# Patient Record
Sex: FEMALE | Race: WHITE | NOT HISPANIC OR LATINO
[De-identification: names, ages, dates, MRNs, and addresses within clinical notes are randomized per-mention and may not be internally consistent; named-entity substitution may affect disease eponyms.]

---

## 2022-05-10 PROBLEM — Z00.00 ENCOUNTER FOR PREVENTIVE HEALTH EXAMINATION: Status: ACTIVE | Noted: 2022-05-10

## 2022-05-12 ENCOUNTER — APPOINTMENT (OUTPATIENT)
Dept: ORTHOPEDIC SURGERY | Facility: CLINIC | Age: 52
End: 2022-05-12
Payer: COMMERCIAL

## 2022-05-12 DIAGNOSIS — S46.011A STRAIN OF MUSCLE(S) AND TENDON(S) OF THE ROTATOR CUFF OF RIGHT SHOULDER, INITIAL ENCOUNTER: ICD-10-CM

## 2022-05-12 PROCEDURE — 99213 OFFICE O/P EST LOW 20 MIN: CPT

## 2022-05-13 PROBLEM — S46.011A TRAUMATIC COMPLETE TEAR OF RIGHT ROTATOR CUFF, INITIAL ENCOUNTER: Status: ACTIVE | Noted: 2022-05-12

## 2022-05-13 NOTE — IMAGING
[de-identified] : The patient is a well appearing 51 year old female of their stated age.\par Neck is supple & nontender to palpation. Negative Spurling's test.\par \par \par Surgical site: right shoulder \par \par Incision sites: Well healed\par \par Range of motion: 170/170/80/30/30\par \par Motor Testin/5\par \par Stability Testing: Stable ligamentous exam\par \par Swelling/Effusion: None\par \par Tenderness to palpation: None\par \par Provocative testing: Negative\par \par Right Calf: soft and nontender\par Left Calf: soft and nontender\par \par Neurovascular Examination: Grossly intact, 2+ distal pulses \par \par \par Assessment & Plan: The patient is approximately 5.5 months s/p Right shoulder arthroscopic rotator cuff repair, biceps tenodesis in loop and tack fashion, arthroscopic capsular release, subacromial decompression, acromioplasty, coracoacromial ligament release, extensive bursectomy, rotator interval debridement, anterior, posterior, superior and inferior debridement with interval improvement (DOS 21). The patient's post-op plan, protocol and activity modifications have been thoroughly discussed and the patient expressed understanding. Patient will finish out physical therapy c/t to focus on ROM. Discussed home stretching protocol and working on stabilizing their scapula while working on ROM. Patient will follow up prn\par \par The patient's current medication management of their orthopedic diagnosis was reviewed today:\par (1) We discussed a comprehensive treatment plan that included possible pharmaceutical management involving the use of prescription strength medications including but not limited to options such as oral Naprosyn 500mg BID, once daily Meloxicam 15 mg, or 500-650 mg Tylenol versus over the counter oral medications and topical prescription NSAID Pennsaid vs over the counter Voltaren gel.\par (2) There is a moderate risk of morbidity with further treatment, especially from use of prescription strength medications and possible side effects of these medications which include upset stomach with oral medications, skin reactions to topical medications and cardiac/renal issues with long term use.\par (3) I recommended that the patient follow-up with their medical physician to discuss any significant specific potential issues with long term medication use such as interactions with current medications or with exacerbation of underlying medical comorbidities.\par (4) The benefits and risks associated with use of injectable, oral or topical, prescription and over the counter anti-inflammatory medications were discussed with the patient. The patient voiced understanding of the risks including but not limited to bleeding, stroke, kidney dysfunction, heart disease, and were referred to the black box warning label for further information.\par \par I, Bairon Dotson, attest that this documentation has been prepared under the direction and in the presence of Provider Dr. Carroll\par \par The documentation recorded by the scribe accurately reflects the service I personally performed and the decisions made by me.\par

## 2022-05-13 NOTE — HISTORY OF PRESENT ILLNESS
[de-identified] : The patient is a 51 year old right hand dominant female who presents today for right shoulder follow up\par Date of Injury/Onset 3/2021 DOS: 11/23/2021\par Pain: At Rest: 0/10\par With Activity: 2/10\par Mechanism of injury: chronic injury with no specific TACO.\par This is NOT a Work Related Injury being treated under Worker's Compensation.\par This is NOT an athletic injury occurring associated with an interscholastic or organized sports team.\par Quality of symptoms: aching pain and soreness after PT, limited ROM\par Improves with: PT, rest, ice\par Worse with: reaching overhead\par Treatment/Imaging/Studies Since Last Visit: PT\par Reports Available For Review Today: none\par Out of work/sport: not working\par 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 Pain only occurs w/ specific movements. ROM has improved. \par Additional Information: s/p Right shoulder examination under anesthesia, manipulation under anesthesia, arthroscopic rotator cuff repair, biceps tenodesis in loop and tack fashion,\par arthroscopic capsular release, subacromial decompression, acromioplasty, coracoacromial ligament release, extensive bursectomy, rotator interval debridement, anterior, posterior,\par superior and inferior debridement.

## 2023-12-22 ENCOUNTER — APPOINTMENT (OUTPATIENT)
Dept: OPHTHALMOLOGY | Facility: CLINIC | Age: 53
End: 2023-12-22
Payer: COMMERCIAL

## 2023-12-22 ENCOUNTER — NON-APPOINTMENT (OUTPATIENT)
Age: 53
End: 2023-12-22

## 2023-12-22 PROCEDURE — 92015 DETERMINE REFRACTIVE STATE: CPT

## 2023-12-22 PROCEDURE — 92004 COMPRE OPH EXAM NEW PT 1/>: CPT

## 2023-12-22 PROCEDURE — 92310 CONTACT LENS FITTING OU: CPT

## 2024-12-11 ENCOUNTER — APPOINTMENT (OUTPATIENT)
Dept: ORTHOPEDIC SURGERY | Facility: CLINIC | Age: 54
End: 2024-12-11
Payer: COMMERCIAL

## 2024-12-11 VITALS — BODY MASS INDEX: 28.63 KG/M2 | WEIGHT: 200 LBS | HEIGHT: 70 IN

## 2024-12-11 DIAGNOSIS — M79.672 PAIN IN LEFT FOOT: ICD-10-CM

## 2024-12-11 DIAGNOSIS — Z78.9 OTHER SPECIFIED HEALTH STATUS: ICD-10-CM

## 2024-12-11 DIAGNOSIS — M72.2 PLANTAR FASCIAL FIBROMATOSIS: ICD-10-CM

## 2024-12-11 PROCEDURE — 99203 OFFICE O/P NEW LOW 30 MIN: CPT

## 2024-12-11 PROCEDURE — 73630 X-RAY EXAM OF FOOT: CPT | Mod: LT

## 2025-03-25 ENCOUNTER — RESULT REVIEW (OUTPATIENT)
Age: 55
End: 2025-03-25

## 2025-04-18 ENCOUNTER — APPOINTMENT (OUTPATIENT)
Dept: ORTHOPEDIC SURGERY | Facility: CLINIC | Age: 55
End: 2025-04-18
Payer: COMMERCIAL

## 2025-04-18 VITALS — BODY MASS INDEX: 28.63 KG/M2 | HEIGHT: 70 IN | WEIGHT: 200 LBS

## 2025-04-18 DIAGNOSIS — G89.29 PAIN IN LEFT SHOULDER: ICD-10-CM

## 2025-04-18 DIAGNOSIS — M25.512 PAIN IN LEFT SHOULDER: ICD-10-CM

## 2025-04-18 PROCEDURE — 73010 X-RAY EXAM OF SHOULDER BLADE: CPT | Mod: LT

## 2025-04-18 PROCEDURE — 73030 X-RAY EXAM OF SHOULDER: CPT | Mod: LT

## 2025-04-18 PROCEDURE — 99204 OFFICE O/P NEW MOD 45 MIN: CPT

## 2025-05-08 ENCOUNTER — APPOINTMENT (OUTPATIENT)
Dept: ORTHOPEDIC SURGERY | Facility: CLINIC | Age: 55
End: 2025-05-08
Payer: COMMERCIAL

## 2025-05-08 VITALS — HEIGHT: 70 IN | BODY MASS INDEX: 28.63 KG/M2 | WEIGHT: 200 LBS

## 2025-05-08 DIAGNOSIS — M75.22 BICIPITAL TENDINITIS, LEFT SHOULDER: ICD-10-CM

## 2025-05-08 DIAGNOSIS — M19.012 PRIMARY OSTEOARTHRITIS, LEFT SHOULDER: ICD-10-CM

## 2025-05-08 PROCEDURE — 20610 DRAIN/INJ JOINT/BURSA W/O US: CPT | Mod: LT

## 2025-05-08 PROCEDURE — J3490M: CUSTOM

## 2025-05-08 PROCEDURE — 99214 OFFICE O/P EST MOD 30 MIN: CPT | Mod: 25

## 2025-05-09 PROBLEM — M75.22 TENDINITIS OF UPPER BICEPS TENDON OF LEFT SHOULDER: Status: ACTIVE | Noted: 2025-05-08

## 2025-05-09 PROBLEM — M19.012 ARTHRITIS OF LEFT ACROMIOCLAVICULAR JOINT: Status: ACTIVE | Noted: 2025-05-08

## 2025-05-14 ENCOUNTER — APPOINTMENT (OUTPATIENT)
Dept: ORTHOPEDIC SURGERY | Facility: CLINIC | Age: 55
End: 2025-05-14
Payer: COMMERCIAL

## 2025-05-14 VITALS — HEIGHT: 70 IN | WEIGHT: 200 LBS | BODY MASS INDEX: 28.63 KG/M2

## 2025-05-14 DIAGNOSIS — G90.522 COMPLEX REGIONAL PAIN SYNDROME I OF LEFT LOWER LIMB: ICD-10-CM

## 2025-05-14 PROCEDURE — 99214 OFFICE O/P EST MOD 30 MIN: CPT

## 2025-05-21 ENCOUNTER — APPOINTMENT (OUTPATIENT)
Dept: ORTHOPEDIC SURGERY | Facility: CLINIC | Age: 55
End: 2025-05-21

## 2025-06-25 ENCOUNTER — APPOINTMENT (OUTPATIENT)
Dept: ORTHOPEDIC SURGERY | Facility: CLINIC | Age: 55
End: 2025-06-25
Payer: COMMERCIAL

## 2025-06-25 VITALS — BODY MASS INDEX: 28.63 KG/M2 | WEIGHT: 200 LBS | HEIGHT: 70 IN

## 2025-06-25 PROBLEM — M76.72 PERONEAL TENDINITIS OF LEFT LOWER EXTREMITY: Status: ACTIVE | Noted: 2025-06-25

## 2025-06-25 PROCEDURE — 99203 OFFICE O/P NEW LOW 30 MIN: CPT

## 2025-06-25 PROCEDURE — 73610 X-RAY EXAM OF ANKLE: CPT | Mod: LT

## 2025-06-25 RX ORDER — ASCORBIC ACID 2000 MG
2000 TABLET, EXTENDED RELEASE ORAL
Refills: 0 | Status: ACTIVE | COMMUNITY

## 2025-07-01 ENCOUNTER — APPOINTMENT (OUTPATIENT)
Dept: NEUROLOGY | Facility: CLINIC | Age: 55
End: 2025-07-01
Payer: COMMERCIAL

## 2025-07-01 PROCEDURE — 95912 NRV CNDJ TEST 11-12 STUDIES: CPT

## 2025-07-01 PROCEDURE — 95886 MUSC TEST DONE W/N TEST COMP: CPT

## 2025-08-21 ENCOUNTER — APPOINTMENT (OUTPATIENT)
Dept: ORTHOPEDIC SURGERY | Facility: CLINIC | Age: 55
End: 2025-08-21
Payer: COMMERCIAL

## 2025-08-21 VITALS — BODY MASS INDEX: 28.63 KG/M2 | WEIGHT: 200 LBS | HEIGHT: 70 IN

## 2025-08-21 PROCEDURE — 99213 OFFICE O/P EST LOW 20 MIN: CPT

## 2025-08-25 ENCOUNTER — APPOINTMENT (OUTPATIENT)
Dept: ORTHOPEDIC SURGERY | Facility: CLINIC | Age: 55
End: 2025-08-25
Payer: COMMERCIAL

## 2025-08-25 VITALS — HEIGHT: 70 IN | WEIGHT: 200 LBS | BODY MASS INDEX: 28.63 KG/M2

## 2025-08-25 PROCEDURE — 99204 OFFICE O/P NEW MOD 45 MIN: CPT

## 2025-08-27 PROBLEM — R22.30 MASS OF SHOULDER REGION: Status: ACTIVE | Noted: 2025-08-27

## 2025-08-28 ENCOUNTER — APPOINTMENT (OUTPATIENT)
Dept: ORTHOPEDIC SURGERY | Facility: CLINIC | Age: 55
End: 2025-08-28
Payer: COMMERCIAL

## 2025-08-28 VITALS — BODY MASS INDEX: 28.63 KG/M2 | HEIGHT: 70 IN | WEIGHT: 200 LBS

## 2025-08-28 DIAGNOSIS — M19.012 PRIMARY OSTEOARTHRITIS, LEFT SHOULDER: ICD-10-CM

## 2025-08-28 PROCEDURE — 99213 OFFICE O/P EST LOW 20 MIN: CPT

## 2025-09-15 ENCOUNTER — TRANSCRIPTION ENCOUNTER (OUTPATIENT)
Age: 55
End: 2025-09-15

## 2025-09-15 ENCOUNTER — APPOINTMENT (OUTPATIENT)
Dept: ORTHOPEDIC SURGERY | Facility: CLINIC | Age: 55
End: 2025-09-15

## 2025-09-15 ENCOUNTER — RESULT REVIEW (OUTPATIENT)
Age: 55
End: 2025-09-15